# Patient Record
Sex: FEMALE | Race: WHITE | NOT HISPANIC OR LATINO
[De-identification: names, ages, dates, MRNs, and addresses within clinical notes are randomized per-mention and may not be internally consistent; named-entity substitution may affect disease eponyms.]

---

## 2017-06-17 ENCOUNTER — APPOINTMENT (OUTPATIENT)
Dept: RADIOLOGY | Facility: IMAGING CENTER | Age: 52
End: 2017-06-17

## 2017-06-17 ENCOUNTER — OUTPATIENT (OUTPATIENT)
Dept: OUTPATIENT SERVICES | Facility: HOSPITAL | Age: 52
LOS: 1 days | End: 2017-06-17
Payer: COMMERCIAL

## 2017-06-17 DIAGNOSIS — Z00.8 ENCOUNTER FOR OTHER GENERAL EXAMINATION: ICD-10-CM

## 2017-06-17 PROCEDURE — 71046 X-RAY EXAM CHEST 2 VIEWS: CPT

## 2017-11-19 ENCOUNTER — TRANSCRIPTION ENCOUNTER (OUTPATIENT)
Age: 52
End: 2017-11-19

## 2017-11-28 ENCOUNTER — APPOINTMENT (OUTPATIENT)
Dept: MAMMOGRAPHY | Facility: CLINIC | Age: 52
End: 2017-11-28
Payer: COMMERCIAL

## 2017-11-28 ENCOUNTER — APPOINTMENT (OUTPATIENT)
Dept: ULTRASOUND IMAGING | Facility: CLINIC | Age: 52
End: 2017-11-28
Payer: COMMERCIAL

## 2017-11-28 ENCOUNTER — OUTPATIENT (OUTPATIENT)
Dept: OUTPATIENT SERVICES | Facility: HOSPITAL | Age: 52
LOS: 1 days | End: 2017-11-28
Payer: COMMERCIAL

## 2017-11-28 DIAGNOSIS — R92.8 OTHER ABNORMAL AND INCONCLUSIVE FINDINGS ON DIAGNOSTIC IMAGING OF BREAST: ICD-10-CM

## 2017-11-28 PROCEDURE — G0204: CPT | Mod: 26

## 2017-11-28 PROCEDURE — 76641 ULTRASOUND BREAST COMPLETE: CPT | Mod: 26,50

## 2017-11-28 PROCEDURE — G0279: CPT | Mod: 26

## 2017-11-28 PROCEDURE — G0279: CPT

## 2017-11-28 PROCEDURE — 77066 DX MAMMO INCL CAD BI: CPT

## 2017-11-28 PROCEDURE — 76641 ULTRASOUND BREAST COMPLETE: CPT

## 2018-12-08 ENCOUNTER — TRANSCRIPTION ENCOUNTER (OUTPATIENT)
Age: 53
End: 2018-12-08

## 2021-07-08 ENCOUNTER — EMERGENCY (EMERGENCY)
Facility: HOSPITAL | Age: 56
LOS: 1 days | Discharge: HOME | End: 2021-07-08
Attending: STUDENT IN AN ORGANIZED HEALTH CARE EDUCATION/TRAINING PROGRAM | Admitting: STUDENT IN AN ORGANIZED HEALTH CARE EDUCATION/TRAINING PROGRAM
Payer: COMMERCIAL

## 2021-07-08 VITALS
TEMPERATURE: 98 F | SYSTOLIC BLOOD PRESSURE: 144 MMHG | RESPIRATION RATE: 16 BRPM | OXYGEN SATURATION: 100 % | HEART RATE: 77 BPM | DIASTOLIC BLOOD PRESSURE: 61 MMHG

## 2021-07-08 VITALS
HEART RATE: 77 BPM | RESPIRATION RATE: 18 BRPM | SYSTOLIC BLOOD PRESSURE: 134 MMHG | TEMPERATURE: 98 F | OXYGEN SATURATION: 100 % | DIASTOLIC BLOOD PRESSURE: 95 MMHG

## 2021-07-08 LAB
ALBUMIN SERPL ELPH-MCNC: 4.1 G/DL — SIGNIFICANT CHANGE UP (ref 3.3–5)
ALP SERPL-CCNC: 111 U/L — SIGNIFICANT CHANGE UP (ref 40–120)
ALT FLD-CCNC: 10 U/L — SIGNIFICANT CHANGE UP (ref 4–33)
ANION GAP SERPL CALC-SCNC: 10 MMOL/L — SIGNIFICANT CHANGE UP (ref 7–14)
APPEARANCE UR: CLEAR — SIGNIFICANT CHANGE UP
AST SERPL-CCNC: 14 U/L — SIGNIFICANT CHANGE UP (ref 4–32)
BASOPHILS # BLD AUTO: 0.02 K/UL — SIGNIFICANT CHANGE UP (ref 0–0.2)
BASOPHILS NFR BLD AUTO: 0.3 % — SIGNIFICANT CHANGE UP (ref 0–2)
BILIRUB SERPL-MCNC: 0.4 MG/DL — SIGNIFICANT CHANGE UP (ref 0.2–1.2)
BILIRUB UR-MCNC: NEGATIVE — SIGNIFICANT CHANGE UP
BUN SERPL-MCNC: 12 MG/DL — SIGNIFICANT CHANGE UP (ref 7–23)
CALCIUM SERPL-MCNC: 9.7 MG/DL — SIGNIFICANT CHANGE UP (ref 8.4–10.5)
CHLORIDE SERPL-SCNC: 107 MMOL/L — SIGNIFICANT CHANGE UP (ref 98–107)
CO2 SERPL-SCNC: 21 MMOL/L — LOW (ref 22–31)
COLOR SPEC: SIGNIFICANT CHANGE UP
CREAT SERPL-MCNC: 0.73 MG/DL — SIGNIFICANT CHANGE UP (ref 0.5–1.3)
DIFF PNL FLD: NEGATIVE — SIGNIFICANT CHANGE UP
EOSINOPHIL # BLD AUTO: 0.13 K/UL — SIGNIFICANT CHANGE UP (ref 0–0.5)
EOSINOPHIL NFR BLD AUTO: 2 % — SIGNIFICANT CHANGE UP (ref 0–6)
GLUCOSE SERPL-MCNC: 94 MG/DL — SIGNIFICANT CHANGE UP (ref 70–99)
GLUCOSE UR QL: NEGATIVE — SIGNIFICANT CHANGE UP
HCT VFR BLD CALC: 41.5 % — SIGNIFICANT CHANGE UP (ref 34.5–45)
HGB BLD-MCNC: 13.8 G/DL — SIGNIFICANT CHANGE UP (ref 11.5–15.5)
IANC: 4.02 K/UL — SIGNIFICANT CHANGE UP (ref 1.5–8.5)
IMM GRANULOCYTES NFR BLD AUTO: 0.3 % — SIGNIFICANT CHANGE UP (ref 0–1.5)
KETONES UR-MCNC: NEGATIVE — SIGNIFICANT CHANGE UP
LEUKOCYTE ESTERASE UR-ACNC: NEGATIVE — SIGNIFICANT CHANGE UP
LYMPHOCYTES # BLD AUTO: 2.02 K/UL — SIGNIFICANT CHANGE UP (ref 1–3.3)
LYMPHOCYTES # BLD AUTO: 30.7 % — SIGNIFICANT CHANGE UP (ref 13–44)
MCHC RBC-ENTMCNC: 30.4 PG — SIGNIFICANT CHANGE UP (ref 27–34)
MCHC RBC-ENTMCNC: 33.3 GM/DL — SIGNIFICANT CHANGE UP (ref 32–36)
MCV RBC AUTO: 91.4 FL — SIGNIFICANT CHANGE UP (ref 80–100)
MONOCYTES # BLD AUTO: 0.37 K/UL — SIGNIFICANT CHANGE UP (ref 0–0.9)
MONOCYTES NFR BLD AUTO: 5.6 % — SIGNIFICANT CHANGE UP (ref 2–14)
NEUTROPHILS # BLD AUTO: 4.02 K/UL — SIGNIFICANT CHANGE UP (ref 1.8–7.4)
NEUTROPHILS NFR BLD AUTO: 61.1 % — SIGNIFICANT CHANGE UP (ref 43–77)
NITRITE UR-MCNC: NEGATIVE — SIGNIFICANT CHANGE UP
NRBC # BLD: 0 /100 WBCS — SIGNIFICANT CHANGE UP
NRBC # FLD: 0 K/UL — SIGNIFICANT CHANGE UP
PH UR: 7 — SIGNIFICANT CHANGE UP (ref 5–8)
PLATELET # BLD AUTO: 205 K/UL — SIGNIFICANT CHANGE UP (ref 150–400)
POTASSIUM SERPL-MCNC: 3.8 MMOL/L — SIGNIFICANT CHANGE UP (ref 3.5–5.3)
POTASSIUM SERPL-SCNC: 3.8 MMOL/L — SIGNIFICANT CHANGE UP (ref 3.5–5.3)
PROT SERPL-MCNC: 6.9 G/DL — SIGNIFICANT CHANGE UP (ref 6–8.3)
PROT UR-MCNC: NEGATIVE — SIGNIFICANT CHANGE UP
RBC # BLD: 4.54 M/UL — SIGNIFICANT CHANGE UP (ref 3.8–5.2)
RBC # FLD: 13.5 % — SIGNIFICANT CHANGE UP (ref 10.3–14.5)
SODIUM SERPL-SCNC: 138 MMOL/L — SIGNIFICANT CHANGE UP (ref 135–145)
SP GR SPEC: 1.01 — LOW (ref 1.01–1.02)
TROPONIN T, HIGH SENSITIVITY RESULT: <6 NG/L — SIGNIFICANT CHANGE UP
UROBILINOGEN FLD QL: SIGNIFICANT CHANGE UP
WBC # BLD: 6.58 K/UL — SIGNIFICANT CHANGE UP (ref 3.8–10.5)
WBC # FLD AUTO: 6.58 K/UL — SIGNIFICANT CHANGE UP (ref 3.8–10.5)

## 2021-07-08 PROCEDURE — 71046 X-RAY EXAM CHEST 2 VIEWS: CPT | Mod: 26

## 2021-07-08 PROCEDURE — 99285 EMERGENCY DEPT VISIT HI MDM: CPT | Mod: 25

## 2021-07-08 PROCEDURE — 93010 ELECTROCARDIOGRAM REPORT: CPT | Mod: 59

## 2021-07-08 RX ORDER — ASPIRIN/CALCIUM CARB/MAGNESIUM 324 MG
324 TABLET ORAL ONCE
Refills: 0 | Status: COMPLETED | OUTPATIENT
Start: 2021-07-08 | End: 2021-07-08

## 2021-07-08 RX ADMIN — Medication 324 MILLIGRAM(S): at 07:45

## 2021-07-08 NOTE — ED PROVIDER NOTE - NS ED ROS FT
General: denies fever, chills  HENT: denies nasal congestion, rhinorrhea  Eyes: denies visual changes, blurred vision  CV: + chest pain, no palpitations  Resp: denies difficulty breathing, cough  Abdominal: denies nausea, vomiting, diarrhea, abdominal pain  : denies urinary pain or discharge  MSK: denies muscle aches, leg swelling  Neuro: denies headaches, numbness, tingling  Skin: denies rashes, bruises

## 2021-07-08 NOTE — ED PROVIDER NOTE - OBJECTIVE STATEMENT
55yo F w/ history of bipolar type II coming in w/ left sided chest pain x1 day that began intermittently and now is persistent. Pain is dull, non radiating, non pleuritic and not associated w/ nausea, exertion, SOB, or diaphoresis. Denies any recent travel, sick contacts, n/v/d, abdominal pain or dysuria; LMP >1 year ago. 40PPY smoker.

## 2021-07-08 NOTE — ED PROVIDER NOTE - PHYSICAL EXAMINATION
GENERAL: well appearing in no acute distress, non-toxic appearing  HEAD: normocephalic, atraumatic  HENT: airway intact, neck supple  EYES: normal conjunctiva  CARDIAC: regular rate and rhythm, normal S1S2, no appreciable murmurs, 2+ pulses in UE/LE b/l  PULM: normal breath sounds, clear to ascultation bilaterally, no rales, rhonchi, wheezing  GI: abdomen nondistended, soft, nontender, no guarding, rebound tenderness  : no CVA tenderness b/l, no suprapubic tenderness  NEURO: no focal motor or sensory deficits  MSK: no peripheral edema, no calf tenderness b/l  SKIN: well-perfused, extremities warm, no visible rashes  PSYCH: appropriate mood and affect

## 2021-07-08 NOTE — ED PROVIDER NOTE - CLINICAL SUMMARY MEDICAL DECISION MAKING FREE TEXT BOX
55yo F w/ history of bipolar type II coming in w/ left sided chest pain x1 day; patient has very few risk factors but will evaluate for ACS vs angina;  Plan: labs + chest xray + symptomatic relief + reassess

## 2021-07-08 NOTE — ED PROVIDER NOTE - PROGRESS NOTE DETAILS
Pranav, PGY2: Patient reassessed and noting anxiety secondary to the bill and requesting to leave. Discussed w/ patient the unremarkable lab tests + chest xray; will provide patient with outpatient cardiology follow up for stress/echo. Provided patient with strict return precautions; patient understands and agrees w/ plan Pranav, PGY2: Patient reassessed and noting anxiety secondary to the bill and requesting to leave. Discussed w/ patient the unremarkable lab tests + chest xray ; will provide patient with outpatient cardiology follow up for stress/echo. Provided patient with strict return precautions; patient understands and agrees w/ plan Parnav PGY2: Cannot enter chest xray at this time; clear lungs b/l; no acute findings.

## 2021-07-08 NOTE — ED PROVIDER NOTE - NSFOLLOWUPINSTRUCTIONS_ED_ALL_ED_FT
- Please follow up with your PMD within 1 week for follow up.    - We recommend you follow up with a cardiologist within 1 week to schedule an echocardiogram and stress test.    - We recommend you consider decreasing/quitting smoking, as it raises your likelihood for serious medical problems.    - Take all home medications as prescribed.       SEEK IMMEDIATE MEDICAL CARE IF YOU HAVE ANY OF THE FOLLOWING SYMPTOMS: worsening chest pain, coughing up blood, unexplained back/neck/jaw pain, severe abdominal pain, dizziness or lightheadedness, fainting, shortness of breath, sweaty or clammy skin, vomiting, or racing heart beat. These symptoms may represent a serious problem that is an emergency. Do not wait to see if the symptoms will go away. Get medical help right away. Call 911 and do not drive yourself to the hospital.

## 2021-07-08 NOTE — ED ADULT NURSE NOTE - OBJECTIVE STATEMENT
received pt to rm 23, A&Ox4, amb. 55y/o female hx of bipolar 2 complaining of left sided non radiating chest pain and lower back pain. Pt states symptoms have been persistent since yesterday. Resps are even and unlabored, spo2 100% on Ra. Abdomen is soft nontender, nondistended. Denies any other acute complaints. 20G IV placed to left arm, labs sent. Meds given as ordered. VSS, Sinus rhythm on the monitor.

## 2021-07-08 NOTE — ED PROVIDER NOTE - ATTENDING CONTRIBUTION TO CARE
I performed a history and physical exam of the patient and discussed their management with the student/resident.  I reviewed the note and agree with the documented findings and plan of care except as noted below. My medical decision making and observations are as follows:     57yo F w/ history of bipolar type II coming in w/ left sided chest pain x1 day that began intermittently and now is persistent. Pain is dull, non radiating, non pleuritic and not associated w/ nausea, exertion, SOB, or diaphoresis.  Pt in NAD, A&O X 3, heart rrr, lungs cta, no peripheral edema.  Low suspicion for ACS given pt's low risk factors, however will eval with labs .trop, ekg, cxr and reassess.  - Priyanka Blackman,  I performed a history and physical exam of the patient and discussed their management with the resident.  I reviewed the note and agree with the documented findings and plan of care except as noted below. My medical decision making and observations are as follows:     55yo F w/ history of bipolar type II coming in w/ left sided chest pain x1 day that began intermittently and now is persistent. Pain is dull, non radiating, non pleuritic and not associated w/ nausea, exertion, SOB, or diaphoresis.  Pt in NAD, A&O X 3, heart rrr, lungs cta, no peripheral edema.  Low suspicion for ACS given pt's low risk factors, however will eval with labs .trop, ekg, cxr and reassess.  - Priyanka Blackman,

## 2021-07-08 NOTE — ED PROVIDER NOTE - PATIENT PORTAL LINK FT
You can access the FollowMyHealth Patient Portal offered by [facility] by registering at the following website: [email or web address]. By joining [employer]’s FollowMyHealth portal, you will also be able to view your health information using other applications (apps) compatible with our system.

## 2022-03-04 NOTE — ED ADULT TRIAGE NOTE - ESI TRIAGE ACUITY LEVEL, MLM
Health Maintenance Due   Topic Date Due   • Diabetes Foot Exam  07/24/2021   • Medicare Wellness Visit  01/22/2022       Patient is due for topics as listed above but is not proceeding with Diabetes Foot Exam and MWV (Medicare Wellness Visit) at this time. Education provided for Diabetes Foot Exam and MWV (Medicare Wellness Visit).   3

## 2023-02-11 ENCOUNTER — EMERGENCY (EMERGENCY)
Facility: HOSPITAL | Age: 58
LOS: 1 days | Discharge: HOME | End: 2023-02-11
Attending: STUDENT IN AN ORGANIZED HEALTH CARE EDUCATION/TRAINING PROGRAM | Admitting: STUDENT IN AN ORGANIZED HEALTH CARE EDUCATION/TRAINING PROGRAM
Payer: COMMERCIAL

## 2023-02-11 VITALS
HEIGHT: 68 IN | DIASTOLIC BLOOD PRESSURE: 74 MMHG | SYSTOLIC BLOOD PRESSURE: 141 MMHG | HEART RATE: 72 BPM | TEMPERATURE: 98 F | RESPIRATION RATE: 16 BRPM | OXYGEN SATURATION: 100 % | WEIGHT: 169.98 LBS

## 2023-02-11 VITALS
HEART RATE: 66 BPM | RESPIRATION RATE: 16 BRPM | DIASTOLIC BLOOD PRESSURE: 67 MMHG | OXYGEN SATURATION: 100 % | SYSTOLIC BLOOD PRESSURE: 124 MMHG

## 2023-02-11 PROBLEM — F31.81 BIPOLAR II DISORDER: Chronic | Status: ACTIVE | Noted: 2021-07-08

## 2023-02-11 LAB
ALBUMIN SERPL ELPH-MCNC: 4 G/DL — SIGNIFICANT CHANGE UP (ref 3.3–5)
ALP SERPL-CCNC: 135 U/L — HIGH (ref 40–120)
ALT FLD-CCNC: 9 U/L — SIGNIFICANT CHANGE UP (ref 4–33)
ANION GAP SERPL CALC-SCNC: 9 MMOL/L — SIGNIFICANT CHANGE UP (ref 7–14)
APTT BLD: 34.7 SEC — SIGNIFICANT CHANGE UP (ref 27–36.3)
AST SERPL-CCNC: 14 U/L — SIGNIFICANT CHANGE UP (ref 4–32)
BASOPHILS # BLD AUTO: 0.03 K/UL — SIGNIFICANT CHANGE UP (ref 0–0.2)
BASOPHILS NFR BLD AUTO: 0.5 % — SIGNIFICANT CHANGE UP (ref 0–2)
BILIRUB SERPL-MCNC: <0.2 MG/DL — SIGNIFICANT CHANGE UP (ref 0.2–1.2)
BUN SERPL-MCNC: 13 MG/DL — SIGNIFICANT CHANGE UP (ref 7–23)
CALCIUM SERPL-MCNC: 9.4 MG/DL — SIGNIFICANT CHANGE UP (ref 8.4–10.5)
CHLORIDE SERPL-SCNC: 106 MMOL/L — SIGNIFICANT CHANGE UP (ref 98–107)
CO2 SERPL-SCNC: 22 MMOL/L — SIGNIFICANT CHANGE UP (ref 22–31)
CREAT SERPL-MCNC: 0.71 MG/DL — SIGNIFICANT CHANGE UP (ref 0.5–1.3)
EGFR: 100 ML/MIN/1.73M2 — SIGNIFICANT CHANGE UP
EOSINOPHIL # BLD AUTO: 0.09 K/UL — SIGNIFICANT CHANGE UP (ref 0–0.5)
EOSINOPHIL NFR BLD AUTO: 1.5 % — SIGNIFICANT CHANGE UP (ref 0–6)
GLUCOSE SERPL-MCNC: 123 MG/DL — HIGH (ref 70–99)
HCT VFR BLD CALC: 40.6 % — SIGNIFICANT CHANGE UP (ref 34.5–45)
HGB BLD-MCNC: 13.4 G/DL — SIGNIFICANT CHANGE UP (ref 11.5–15.5)
IANC: 3.54 K/UL — SIGNIFICANT CHANGE UP (ref 1.8–7.4)
IMM GRANULOCYTES NFR BLD AUTO: 0.7 % — SIGNIFICANT CHANGE UP (ref 0–0.9)
INR BLD: 1.03 RATIO — SIGNIFICANT CHANGE UP (ref 0.88–1.16)
LYMPHOCYTES # BLD AUTO: 1.87 K/UL — SIGNIFICANT CHANGE UP (ref 1–3.3)
LYMPHOCYTES # BLD AUTO: 31.7 % — SIGNIFICANT CHANGE UP (ref 13–44)
MCHC RBC-ENTMCNC: 29.1 PG — SIGNIFICANT CHANGE UP (ref 27–34)
MCHC RBC-ENTMCNC: 33 GM/DL — SIGNIFICANT CHANGE UP (ref 32–36)
MCV RBC AUTO: 88.3 FL — SIGNIFICANT CHANGE UP (ref 80–100)
MONOCYTES # BLD AUTO: 0.33 K/UL — SIGNIFICANT CHANGE UP (ref 0–0.9)
MONOCYTES NFR BLD AUTO: 5.6 % — SIGNIFICANT CHANGE UP (ref 2–14)
NEUTROPHILS # BLD AUTO: 3.54 K/UL — SIGNIFICANT CHANGE UP (ref 1.8–7.4)
NEUTROPHILS NFR BLD AUTO: 60 % — SIGNIFICANT CHANGE UP (ref 43–77)
NRBC # BLD: 0 /100 WBCS — SIGNIFICANT CHANGE UP (ref 0–0)
NRBC # FLD: 0 K/UL — SIGNIFICANT CHANGE UP (ref 0–0)
PLATELET # BLD AUTO: 201 K/UL — SIGNIFICANT CHANGE UP (ref 150–400)
POTASSIUM SERPL-MCNC: 3.6 MMOL/L — SIGNIFICANT CHANGE UP (ref 3.5–5.3)
POTASSIUM SERPL-SCNC: 3.6 MMOL/L — SIGNIFICANT CHANGE UP (ref 3.5–5.3)
PROT SERPL-MCNC: 7.2 G/DL — SIGNIFICANT CHANGE UP (ref 6–8.3)
PROTHROM AB SERPL-ACNC: 11.9 SEC — SIGNIFICANT CHANGE UP (ref 10.5–13.4)
RBC # BLD: 4.6 M/UL — SIGNIFICANT CHANGE UP (ref 3.8–5.2)
RBC # FLD: 12.6 % — SIGNIFICANT CHANGE UP (ref 10.3–14.5)
SODIUM SERPL-SCNC: 137 MMOL/L — SIGNIFICANT CHANGE UP (ref 135–145)
TROPONIN T, HIGH SENSITIVITY RESULT: <6 NG/L — SIGNIFICANT CHANGE UP
WBC # BLD: 5.9 K/UL — SIGNIFICANT CHANGE UP (ref 3.8–10.5)
WBC # FLD AUTO: 5.9 K/UL — SIGNIFICANT CHANGE UP (ref 3.8–10.5)

## 2023-02-11 PROCEDURE — 71046 X-RAY EXAM CHEST 2 VIEWS: CPT | Mod: 26

## 2023-02-11 PROCEDURE — 99285 EMERGENCY DEPT VISIT HI MDM: CPT

## 2023-02-11 RX ORDER — FAMOTIDINE 10 MG/ML
20 INJECTION INTRAVENOUS ONCE
Refills: 0 | Status: COMPLETED | OUTPATIENT
Start: 2023-02-11 | End: 2023-02-11

## 2023-02-11 RX ADMIN — FAMOTIDINE 20 MILLIGRAM(S): 10 INJECTION INTRAVENOUS at 10:17

## 2023-02-11 RX ADMIN — Medication 30 MILLILITER(S): at 10:43

## 2023-02-11 NOTE — ED PROVIDER NOTE - ATTENDING CONTRIBUTION TO CARE
I (Elsa) agree with above, I performed a history and physical. Counseled [facility] medical staff, physician assistant, and/or medical student on medical decision making as documented. Medical decisions and treatment interventions were made in real time during the patient encounter. Additionally and/or with the following exceptions: Patient is a 57-year-old female past medical history of bipolar, depression who is presenting to the emergency department with chest pain.  Patient says chest pain is central chest, pressure like.  Had an abnormal EKG at urgent care.  Has had the pain for 3 weeks.  Also has shortness of breath on exertion.  Patient is well-appearing, no radial pulse deficit.  Abdomen soft nontender not distended.  CBC was within normal limits, coagulation studies within normal limits, CMP within normal limits, troponin less than 6 nondetectable chest x-ray was clear.  Patient was offered CDU for stress test but patient preferred to follow-up as an outpatient given cardiology referral.  Strict return precautions.

## 2023-02-11 NOTE — ED PROVIDER NOTE - NSFOLLOWUPINSTRUCTIONS_ED_ALL_ED_FT
You were seen in the Emergency Room today because of chest pain. A copy of your results is included in your discharge paperwork.     Please follow-up with your Primary Care Physician within the week.   We also recommend you following up with a Cardiologist for further management and workup. We have let the [facility] Team know of your case. They will be in contact with you to try and set up an appointment. You can also call [phone] and ask for an appointment at a convenient location.     WHAT YOU NEED TO KNOW:  Chest pain can be caused by many different conditions which may or may not be dangerous. Causes include heartburn, lung infections, heart attack, blood clot in lungs, skin infections, strain or damage to muscle, cartilage, or bones, etc. Your chest pain may come and go. It may also be constant.     DISCHARGE INSTRUCTIONS:    Lifestyle   •Rest as directed by your health care provider.  •Do not use any products that contain nicotine or tobacco, such as cigarettes and e-cigarettes. If you need help quitting, ask your health care provider.  •Do not drink alcohol.  •Make healthy lifestyle choices, e.g. getting regular exercise, eating a healthy diet, reducing stress, maintaining a healthy weight.     General instructions   •Pay attention to any changes in your symptoms. Tell your health care provider about them or any new symptoms.  •Avoid any activities that cause chest pain.  •Keep all follow-up visits as told by your health care provider. This is important. This includes visits for any further testing if your chest pain does not go away.      Contact a health care provider if:  •Your chest pain does not go away.  •You feel depressed.  •You have a fever.    Come back to the Emergency Room if:  •Your chest pain gets worse.  •You have a cough that gets worse, or you cough up blood.  •You have severe pain in your abdomen.  •You have sudden, unexplained discomfort in your arms, back, neck, or jaw.  •You have shortness of breath at any time.  •You feel nausea or you vomit.  •You suddenly feel lightheaded or dizzy, or you faint.  •You have severe weakness, or unexplained weakness or fatigue.  •Your heart begins to beat quickly, or it feels like it is skipping beats.    These symptoms may represent a serious problem that is an emergency. Do not wait to see if the symptoms will go away. Get medical help right away. Call 911 and do not drive yourself to the hospital. You were seen in the Emergency Room today because of chest pain. A copy of your results is included in your discharge paperwork.     Please follow-up with your Primary Care Physician within the week.   We also recommend you following up with a Cardiologist for further management and workup. We have let the [facility] Team know of your case. They will be in contact with you to try and set up an appointment. You can also call [phone] and ask for an appointment at a convenient location.       WHAT YOU NEED TO KNOW:  Chest pain can be caused by many different conditions which may or may not be dangerous. Causes include heartburn, lung infections, heart attack, blood clot in lungs, skin infections, strain or damage to muscle, cartilage, or bones, etc. Your chest pain may come and go. It may also be constant.     DISCHARGE INSTRUCTIONS:    Lifestyle   •Rest as directed by your health care provider.  •Do not use any products that contain nicotine or tobacco, such as cigarettes and e-cigarettes. If you need help quitting, ask your health care provider.  •Do not drink alcohol.  •Make healthy lifestyle choices, e.g. getting regular exercise, eating a healthy diet, reducing stress, maintaining a healthy weight.     General instructions   •Pay attention to any changes in your symptoms. Tell your health care provider about them or any new symptoms.  •Avoid any activities that cause chest pain.  •Keep all follow-up visits as told by your health care provider. This is important. This includes visits for any further testing if your chest pain does not go away.      Contact a health care provider if:  •Your chest pain does not go away.  •You feel depressed.  •You have a fever.    Come back to the Emergency Room if:  •Your chest pain gets worse.  •You have a cough that gets worse, or you cough up blood.  •You have severe pain in your abdomen.  •You have sudden, unexplained discomfort in your arms, back, neck, or jaw.  •You have shortness of breath at any time.  •You feel nausea or you vomit.  •You suddenly feel lightheaded or dizzy, or you faint.  •You have severe weakness, or unexplained weakness or fatigue.  •Your heart begins to beat quickly, or it feels like it is skipping beats.    These symptoms may represent a serious problem that is an emergency. Do not wait to see if the symptoms will go away. Get medical help right away. Call 911 and do not drive yourself to the hospital.

## 2023-02-11 NOTE — ED PROVIDER NOTE - PROGRESS NOTE DETAILS
Jonathan PGY2 - labs nonactionable. Patient stable for discharge. Understands the Emergency Room work-up and discharge precautions. Will follow-up with pcp Jonathan, PGY2 - labs nonactionable. Patient was offered CDU for stress test but declined, prefers to follow-up with outpatient cardiology for further monitoring. Patient stable for discharge. Understands the Emergency Room work-up and discharge precautions. Will follow-up with pcp/cardio

## 2023-02-11 NOTE — ED ADULT TRIAGE NOTE - CHIEF COMPLAINT QUOTE
c/o intermittent left upper chest and mid upper epigastric pain x 3 weeks. Seen at Urgent Care yesterday and was told her EKG is abnormal. Hx bipolar, depression, anxiety

## 2023-02-11 NOTE — ED PROVIDER NOTE - OBJECTIVE STATEMENT
57-year-old woman with PMH bipolar, depression, anxiety, no recent changes in her medications, presenting due to left upper chest and epigastric pain for the last 3 weeks.  Patient states that the chest pain is intermittent, nonradiating. Went to urgent care yesterday night where an EKG interpretation stated there was ST depressions. On visual examination there are no ST depressions. Currently denies chest pain.  Denies fever, SOB, abdominal pain. Denies hemoptysis, chest pain with exertion, recent travel or surgery, hormone use.

## 2023-02-11 NOTE — ED PROVIDER NOTE - PATIENT PORTAL LINK FT
You can access the FollowMyHealth Patient Portal offered by [facility] by registering at the following website: [email or web address]. By joining [employer]’s FollowMyHealth portal, you will also be able to view your health information using other applications (apps) compatible with our system. You can access the FollowMyHealth Patient Portal offered by [facility] by registering at the following website: [email or web address]. By joining [employer]’s FollowMyHealth portal, you will also be able to view your health information using other applications (apps) compatible with our system.

## 2023-02-11 NOTE — ED PROVIDER NOTE - CLINICAL SUMMARY MEDICAL DECISION MAKING FREE TEXT BOX
Jonathan, PGY2 - 57-year-old woman with no cardiac risk factors presenting due to left upper chest and epigastric pain x3 weeks.  Chest pain nonreproducible on exam, intermittent in nature, non-exertional. EKG shows no ST elevations with reciprocal depressions.  Prior EKG from urgent care also does not show any significant concerning findings even though there was an mechanical interpretation showing ST depressions.  Labs, cxr, reassess. Consider gastritis. Patient to follow-up with cardiology if no acute findings on work-up. *The above represents an initial assessment/impression. Please refer to progress notes for potential changes in patient clinical course* Jonathan, PGY2 - 57-year-old woman with no cardiac risk factors presenting due to left upper chest and epigastric pain x3 weeks.  Chest pain nonreproducible on exam, intermittent in nature, non-exertional. EKG shows no ST elevations with reciprocal depressions.  Prior EKG from urgent care also does not show any significant concerning findings even though there was an mechanical interpretation showing ST depressions.  Labs, cxr, reassess. Consider gastritis. Patient to follow-up with cardiology if no acute findings on work-up. *The above represents an initial assessment/impression. Please refer to progress notes for potential changes in patient clinical course*    O'Perry DO PGY-3.  AYLIN [place] - 57-year-old female past medical history of bipolar disorder presents with 3 weeks of left-sided chest pain.  Pain is described as a pressure-like sensation.  It is intermittent and nonradiating.  Patient states it is sometimes worse with exertion.  Patient states she has never had a stress test or echocardiogram.  Other past medical history is hyperlipidemia but is not on medications.  Patient used to smoke cigarettes but now vapes.  EKG here is normal sinus without any ST elevations or depressions.  Will evaluate for ACS.  Other differential diagnosis includes but not limited to GERD versus MSK.  Dispo pending work-up Jonathan, PGY2 - 57-year-old woman with no cardiac risk factors presenting due to left upper chest and epigastric pain x3 weeks.  Chest pain nonreproducible on exam, intermittent in nature, non-exertional. EKG shows no ST elevations with reciprocal depressions.  Prior EKG from urgent care also does not show any significant concerning findings even though there was an mechanical interpretation showing ST depressions.  Labs, cxr, reassess. Consider gastritis. Patient to follow-up with cardiology if no acute findings on work-up. *The above represents an initial assessment/impression. Please refer to progress notes for potential changes in patient clinical course*    O'Livingston DO PGY-3.  AYLIN Velazquez Side - 57-year-old female past medical history of bipolar disorder presents with 3 weeks of left-sided chest pain.  Pain is described as a pressure-like sensation.  It is intermittent and nonradiating.  Patient states it is sometimes worse with exertion.  Patient states she has never had a stress test or echocardiogram.  Other past medical history is hyperlipidemia but is not on medications.  Patient used to smoke cigarettes but now vapes.  EKG here is normal sinus without any ST elevations or depressions.  Will evaluate for ACS.  Other differential diagnosis includes but not limited to GERD versus MSK.  Dispo pending work-up    ===================================  update (Julio Hunter MD; attending emergency medicine and medical toxicology)  Patient is a 57-year-old female past medical history of bipolar, depression who is presenting to the emergency department with chest pain.  Patient says chest pain is central chest, pressure like.  Had an abnormal EKG at urgent care.  Has had the pain for 3 weeks.  Also has shortness of breath on exertion.  Patient is well-appearing, no radial pulse deficit.  Abdomen soft nontender not distended.  CBC was within normal limits, coagulation studies within normal limits, CMP within normal limits, troponin less than 6 nondetectable chest x-ray was clear.  Patient was offered CDU for stress test but patient preferred to follow-up as an outpatient given cardiology referral.  Strict return precautions.

## 2024-04-19 NOTE — ED ADULT NURSE NOTE - OBJECTIVE STATEMENT
Pt received to rm 13, awake and alert, A&OX4, ambulatory. C/o intermittent CP x3 months to L upper chest and epigastric region without radiation. States pain occurs intermittently but daily. States she went to urgent care yesterday and was told she had an abnormal EKG. Reporting mild worsening pain on deep inspiration.  Respirations even and unlabored. Resting comfortably. Denies CP, SOB, N/V, HA, dizziness, palpitations, fatigue. 20G IV placed to L AC . Bed in lowest position, call bell within reach. Will continue to monitor.
Never

## 2024-08-12 NOTE — ED ADULT NURSE NOTE - NSIMPLEMENTINTERV_GEN_ALL_ED
show
Implemented All Universal Safety Interventions:  [place] to call system. Call bell, personal items and telephone within reach. Instruct patient to call for assistance. Room bathroom lighting operational. Non-slip footwear when patient is off stretcher. Physically safe environment: no spills, clutter or unnecessary equipment. Stretcher in lowest position, wheels locked, appropriate side rails in place.

## 2024-12-18 NOTE — ED ADULT NURSE NOTE - NS ED NURSE RECORD ANOTHER HT AND WT
Head,  normocephalic,  atraumatic,  Face,  Face within normal limits,  Ears,  External ears within normal limits,  Nose/Nasopharynx,  External nose  normal appearance Lips,  Appearance normal No